# Patient Record
Sex: FEMALE | Race: WHITE | NOT HISPANIC OR LATINO | Employment: FULL TIME | URBAN - METROPOLITAN AREA
[De-identification: names, ages, dates, MRNs, and addresses within clinical notes are randomized per-mention and may not be internally consistent; named-entity substitution may affect disease eponyms.]

---

## 2017-04-10 ENCOUNTER — LAB REQUISITION (OUTPATIENT)
Dept: LAB | Facility: HOSPITAL | Age: 57
End: 2017-04-10
Payer: COMMERCIAL

## 2017-04-10 ENCOUNTER — ALLSCRIPTS OFFICE VISIT (OUTPATIENT)
Dept: OTHER | Facility: OTHER | Age: 57
End: 2017-04-10

## 2017-04-10 DIAGNOSIS — N30.00 ACUTE CYSTITIS WITHOUT HEMATURIA: ICD-10-CM

## 2017-04-10 LAB
BILIRUB UR QL STRIP: NORMAL
CLARITY UR: NORMAL
COLOR UR: CLEAR
GLUCOSE (HISTORICAL): NORMAL
HGB UR QL STRIP.AUTO: NORMAL
KETONES UR STRIP-MCNC: NORMAL MG/DL
LEUKOCYTE ESTERASE UR QL STRIP: NORMAL
NITRITE UR QL STRIP: NORMAL
PH UR STRIP.AUTO: 7 [PH]
PROT UR STRIP-MCNC: NORMAL MG/DL
SP GR UR STRIP.AUTO: 1
UROBILINOGEN UR QL STRIP.AUTO: 0.2

## 2017-04-10 PROCEDURE — 87086 URINE CULTURE/COLONY COUNT: CPT | Performed by: FAMILY MEDICINE

## 2017-04-10 PROCEDURE — 87147 CULTURE TYPE IMMUNOLOGIC: CPT | Performed by: FAMILY MEDICINE

## 2017-04-13 LAB — BACTERIA UR CULT: NORMAL

## 2017-04-14 ENCOUNTER — GENERIC CONVERSION - ENCOUNTER (OUTPATIENT)
Dept: OTHER | Facility: OTHER | Age: 57
End: 2017-04-14

## 2018-01-09 NOTE — RESULT NOTES
Verified Results  (1) URINE CULTURE [id number] 04:58PM Hortensia Najera     Test Name Result Flag Reference   CLINICAL REPORT (Report)     Test:        Urine culture  Specimen Type:   Urine  Specimen Date:   4/10/2017 4:58 PM  Result Date:    4/13/2017 12:50 PM  Result Status:   Final result  Resulting Lab:   [facility] [address] [address]            Tel: [phone]      CULTURE                                       ------------------                                   80,000-89,000 cfu/ml Staphylococcus saprophyticus     *** Routine testing of urine isolates of S  saprophyticus is not recommended      per CLSI guidelines  S  saprophyticus acute, uncomplicated urinary tract infections respond to      concentrations achieved in urine of antimicrobial agents commonly used to      treat them  Antimicrobials commonly used to treat acute, uncomplicated UTI's are      nitrofurantoin, fluoroquinolones or trimethoprim with or without      sulfamethoxazole   ***

## 2018-01-14 VITALS
HEIGHT: 63 IN | BODY MASS INDEX: 29.45 KG/M2 | TEMPERATURE: 96.5 F | DIASTOLIC BLOOD PRESSURE: 84 MMHG | HEART RATE: 68 BPM | WEIGHT: 166.2 LBS | SYSTOLIC BLOOD PRESSURE: 116 MMHG

## 2018-01-29 ENCOUNTER — OFFICE VISIT (OUTPATIENT)
Dept: FAMILY MEDICINE CLINIC | Facility: CLINIC | Age: 58
End: 2018-01-29
Payer: COMMERCIAL

## 2018-01-29 VITALS
TEMPERATURE: 96.5 F | SYSTOLIC BLOOD PRESSURE: 114 MMHG | HEIGHT: 62 IN | HEART RATE: 94 BPM | BODY MASS INDEX: 29.81 KG/M2 | RESPIRATION RATE: 18 BRPM | WEIGHT: 162 LBS | DIASTOLIC BLOOD PRESSURE: 70 MMHG

## 2018-01-29 DIAGNOSIS — G44.009 CLUSTER HEADACHE, NOT INTRACTABLE, UNSPECIFIED CHRONICITY PATTERN: Primary | ICD-10-CM

## 2018-01-29 DIAGNOSIS — F32.A DEPRESSIVE DISORDER: ICD-10-CM

## 2018-01-29 DIAGNOSIS — I10 ESSENTIAL HYPERTENSION: ICD-10-CM

## 2018-01-29 DIAGNOSIS — E78.5 HYPERLIPIDEMIA, UNSPECIFIED HYPERLIPIDEMIA TYPE: ICD-10-CM

## 2018-01-29 DIAGNOSIS — Z78.9 HEALTH MAINTENANCE ALTERATION: Primary | ICD-10-CM

## 2018-01-29 DIAGNOSIS — R68.82 LIBIDO, DECREASED: ICD-10-CM

## 2018-01-29 DIAGNOSIS — F41.9 ANXIETY: ICD-10-CM

## 2018-01-29 PROCEDURE — 3074F SYST BP LT 130 MM HG: CPT | Performed by: FAMILY MEDICINE

## 2018-01-29 PROCEDURE — 3078F DIAST BP <80 MM HG: CPT | Performed by: FAMILY MEDICINE

## 2018-01-29 PROCEDURE — 3008F BODY MASS INDEX DOCD: CPT | Performed by: FAMILY MEDICINE

## 2018-01-29 PROCEDURE — 99214 OFFICE O/P EST MOD 30 MIN: CPT | Performed by: FAMILY MEDICINE

## 2018-01-29 RX ORDER — TRAMADOL HYDROCHLORIDE 50 MG/1
50 TABLET ORAL EVERY 6 HOURS PRN
Qty: 30 TABLET | Refills: 0 | Status: SHIPPED | OUTPATIENT
Start: 2018-01-29 | End: 2018-01-29 | Stop reason: SDUPTHER

## 2018-01-29 RX ORDER — CLONAZEPAM 1 MG/1
1 TABLET ORAL 3 TIMES DAILY
COMMUNITY
End: 2019-08-14 | Stop reason: ALTCHOICE

## 2018-01-29 RX ORDER — FLUNISOLIDE 0.25 MG/ML
2 SOLUTION NASAL 2 TIMES DAILY
COMMUNITY
Start: 2015-04-21 | End: 2019-08-14 | Stop reason: SDUPTHER

## 2018-01-29 RX ORDER — CITALOPRAM 20 MG/1
1 TABLET ORAL DAILY
COMMUNITY
Start: 2015-10-28 | End: 2018-07-18 | Stop reason: SDUPTHER

## 2018-01-29 RX ORDER — ROSUVASTATIN CALCIUM 5 MG/1
1 TABLET, COATED ORAL DAILY
COMMUNITY
Start: 2016-09-26 | End: 2018-07-18 | Stop reason: SDUPTHER

## 2018-01-29 RX ORDER — ARIPIPRAZOLE 5 MG/1
1 TABLET ORAL
COMMUNITY
Start: 2015-10-28 | End: 2018-11-08 | Stop reason: SDUPTHER

## 2018-01-29 RX ORDER — TRAMADOL HYDROCHLORIDE 50 MG/1
50 TABLET ORAL
COMMUNITY
Start: 2011-12-09 | End: 2018-05-31 | Stop reason: SDUPTHER

## 2018-01-29 RX ORDER — BUPROPION HYDROCHLORIDE 150 MG/1
150 TABLET ORAL DAILY
Qty: 30 TABLET | Refills: 2 | Status: SHIPPED | OUTPATIENT
Start: 2018-01-29 | End: 2018-02-12 | Stop reason: SDUPTHER

## 2018-01-29 RX ORDER — LORAZEPAM 1 MG/1
1 TABLET ORAL 3 TIMES DAILY
COMMUNITY
End: 2018-07-31 | Stop reason: SDUPTHER

## 2018-01-29 RX ORDER — TRAMADOL HYDROCHLORIDE 50 MG/1
50 TABLET ORAL EVERY 6 HOURS PRN
Qty: 90 TABLET | Refills: 3 | Status: SHIPPED | OUTPATIENT
Start: 2018-01-29 | End: 2018-10-08 | Stop reason: SDUPTHER

## 2018-01-29 NOTE — PROGRESS NOTES
Assessment/Plan:    No problem-specific Assessment & Plan notes found for this encounter  There are no diagnoses linked to this encounter  Subjective:      Patient ID: Roxana Alanis is a 56 y o  female  Feeling ok except concerned about lack of sexual desire        The following portions of the patient's history were reviewed and updated as appropriate: allergies, current medications, past family history, past medical history, past social history, past surgical history and problem list     Review of Systems   Constitutional: Negative  HENT: Negative  Eyes: Negative  Respiratory: Negative  Cardiovascular: Negative  Gastrointestinal: Negative  Endocrine: Negative  Genitourinary: Negative  Musculoskeletal: Negative  Skin: Negative  Allergic/Immunologic: Negative  Neurological: Negative  Hematological: Negative  Psychiatric/Behavioral: Negative  All other systems reviewed and are negative  Objective:     Physical Exam   Constitutional: She is oriented to person, place, and time  She appears well-developed and well-nourished  HENT:   Head: Normocephalic  Right Ear: Tympanic membrane, external ear and ear canal normal    Left Ear: Tympanic membrane, external ear and ear canal normal    Mouth/Throat: Oropharynx is clear and moist    Eyes: EOM are normal  Pupils are equal, round, and reactive to light  Neck: No thyromegaly present  Cardiovascular: Normal rate, regular rhythm, normal heart sounds and intact distal pulses  Pulmonary/Chest: Breath sounds normal    Abdominal: Soft  She exhibits no mass  There is no tenderness  Musculoskeletal: Normal range of motion  She exhibits no deformity  Lymphadenopathy:     She has no cervical adenopathy  Neurological: She is alert and oriented to person, place, and time  She has normal reflexes  Skin: Skin is warm and dry  No rash noted  No erythema  Psychiatric: She has a normal mood and affect  Nursing note and vitals reviewed

## 2018-02-12 DIAGNOSIS — R68.82 LIBIDO, DECREASED: ICD-10-CM

## 2018-02-13 RX ORDER — BUPROPION HYDROCHLORIDE 150 MG/1
150 TABLET ORAL DAILY
Qty: 90 TABLET | Refills: 3 | Status: SHIPPED | OUTPATIENT
Start: 2018-02-13 | End: 2018-11-08 | Stop reason: SDUPTHER

## 2018-05-26 DIAGNOSIS — R52 PAIN: Primary | ICD-10-CM

## 2018-05-31 DIAGNOSIS — G43.709 CHRONIC MIGRAINE WITHOUT AURA WITHOUT STATUS MIGRAINOSUS, NOT INTRACTABLE: Primary | ICD-10-CM

## 2018-05-31 NOTE — TELEPHONE ENCOUNTER
Dr Kelli Lee pt  That said [facility] has been trying to reach us for a refill with no response from us  I told her I would send it to Dr Kelli Lee and she could refill it tomorrow when she is in  She is asking if there is anyway that you will fill it because she has been out for a couple of days

## 2018-05-31 NOTE — TELEPHONE ENCOUNTER
I do not have diagnosis for this med so unfortunately needs to wait for Dr Juju Mcqueen because she knows pt better

## 2018-06-01 RX ORDER — TRAMADOL HYDROCHLORIDE 50 MG/1
50 TABLET ORAL EVERY 6 HOURS PRN
Qty: 30 TABLET | Refills: 0 | Status: SHIPPED | OUTPATIENT
Start: 2018-06-01 | End: 2018-10-05 | Stop reason: SDUPTHER

## 2018-06-01 RX ORDER — TRAMADOL HYDROCHLORIDE 50 MG/1
TABLET ORAL
Qty: 90 TABLET | Refills: 3 | Status: SHIPPED | OUTPATIENT
Start: 2018-06-01 | End: 2018-10-05 | Stop reason: SDUPTHER

## 2018-06-07 NOTE — TELEPHONE ENCOUNTER
I thought Dr Nancy Justin was supposed to take care of this rx-do I need to still send this rx, please clarify

## 2018-07-18 DIAGNOSIS — E78.5 HYPERLIPIDEMIA, UNSPECIFIED HYPERLIPIDEMIA TYPE: ICD-10-CM

## 2018-07-18 DIAGNOSIS — F32.A DEPRESSION, UNSPECIFIED DEPRESSION TYPE: Primary | ICD-10-CM

## 2018-07-18 RX ORDER — CITALOPRAM 20 MG/1
TABLET ORAL
Qty: 90 TABLET | Refills: 0 | Status: SHIPPED | OUTPATIENT
Start: 2018-07-18 | End: 2018-11-08 | Stop reason: SDUPTHER

## 2018-07-18 RX ORDER — ROSUVASTATIN CALCIUM 5 MG/1
TABLET, COATED ORAL
Qty: 90 TABLET | Refills: 0 | Status: SHIPPED | OUTPATIENT
Start: 2018-07-18 | End: 2018-10-08

## 2018-07-22 ENCOUNTER — TRANSCRIBE ORDERS (OUTPATIENT)
Dept: ADMINISTRATIVE | Facility: HOSPITAL | Age: 58
End: 2018-07-22

## 2018-07-22 ENCOUNTER — APPOINTMENT (OUTPATIENT)
Dept: LAB | Facility: MEDICAL CENTER | Age: 58
End: 2018-07-22
Payer: COMMERCIAL

## 2018-07-22 DIAGNOSIS — F41.9 ANXIETY HYPERVENTILATION: ICD-10-CM

## 2018-07-22 DIAGNOSIS — F45.8 ANXIETY HYPERVENTILATION: ICD-10-CM

## 2018-07-22 DIAGNOSIS — I10 ESSENTIAL HYPERTENSION, MALIGNANT: ICD-10-CM

## 2018-07-22 DIAGNOSIS — E78.5 HYPERLIPIDEMIA, UNSPECIFIED HYPERLIPIDEMIA TYPE: Primary | ICD-10-CM

## 2018-07-22 DIAGNOSIS — E78.5 HYPERLIPIDEMIA, UNSPECIFIED HYPERLIPIDEMIA TYPE: ICD-10-CM

## 2018-07-22 DIAGNOSIS — R68.82 DECREASED LIBIDO: ICD-10-CM

## 2018-07-22 LAB
ALBUMIN SERPL BCP-MCNC: 4 G/DL (ref 3.5–5)
ALP SERPL-CCNC: 112 U/L (ref 46–116)
ALT SERPL W P-5'-P-CCNC: 27 U/L (ref 12–78)
ANION GAP SERPL CALCULATED.3IONS-SCNC: 8 MMOL/L (ref 4–13)
AST SERPL W P-5'-P-CCNC: 18 U/L (ref 5–45)
BASOPHILS # BLD AUTO: 0.03 THOUSANDS/ΜL (ref 0–0.1)
BASOPHILS NFR BLD AUTO: 1 % (ref 0–1)
BILIRUB SERPL-MCNC: 0.69 MG/DL (ref 0.2–1)
BUN SERPL-MCNC: 21 MG/DL (ref 5–25)
CALCIUM SERPL-MCNC: 9.3 MG/DL (ref 8.3–10.1)
CHLORIDE SERPL-SCNC: 107 MMOL/L (ref 100–108)
CHOLEST SERPL-MCNC: 222 MG/DL (ref 50–200)
CO2 SERPL-SCNC: 27 MMOL/L (ref 21–32)
CREAT SERPL-MCNC: 0.86 MG/DL (ref 0.6–1.3)
EOSINOPHIL # BLD AUTO: 0.1 THOUSAND/ΜL (ref 0–0.61)
EOSINOPHIL NFR BLD AUTO: 2 % (ref 0–6)
ERYTHROCYTE [DISTWIDTH] IN BLOOD BY AUTOMATED COUNT: 12.6 % (ref 11.6–15.1)
GFR SERPL CREATININE-BSD FRML MDRD: 76 ML/MIN/1.73SQ M
GLUCOSE P FAST SERPL-MCNC: 112 MG/DL (ref 65–99)
HCT VFR BLD AUTO: 42.9 % (ref 34.8–46.1)
HDLC SERPL-MCNC: 67 MG/DL (ref 40–60)
HGB BLD-MCNC: 13.6 G/DL (ref 11.5–15.4)
IMM GRANULOCYTES # BLD AUTO: 0.02 THOUSAND/UL (ref 0–0.2)
IMM GRANULOCYTES NFR BLD AUTO: 0 % (ref 0–2)
LDLC SERPL CALC-MCNC: 116 MG/DL (ref 0–100)
LYMPHOCYTES # BLD AUTO: 2.24 THOUSANDS/ΜL (ref 0.6–4.47)
LYMPHOCYTES NFR BLD AUTO: 39 % (ref 14–44)
MCH RBC QN AUTO: 29.4 PG (ref 26.8–34.3)
MCHC RBC AUTO-ENTMCNC: 31.7 G/DL (ref 31.4–37.4)
MCV RBC AUTO: 93 FL (ref 82–98)
MONOCYTES # BLD AUTO: 0.38 THOUSAND/ΜL (ref 0.17–1.22)
MONOCYTES NFR BLD AUTO: 7 % (ref 4–12)
NEUTROPHILS # BLD AUTO: 2.97 THOUSANDS/ΜL (ref 1.85–7.62)
NEUTS SEG NFR BLD AUTO: 51 % (ref 43–75)
NONHDLC SERPL-MCNC: 155 MG/DL
NRBC BLD AUTO-RTO: 0 /100 WBCS
PLATELET # BLD AUTO: 334 THOUSANDS/UL (ref 149–390)
PMV BLD AUTO: 9.5 FL (ref 8.9–12.7)
POTASSIUM SERPL-SCNC: 3.8 MMOL/L (ref 3.5–5.3)
PROT SERPL-MCNC: 8.1 G/DL (ref 6.4–8.2)
RBC # BLD AUTO: 4.62 MILLION/UL (ref 3.81–5.12)
SODIUM SERPL-SCNC: 142 MMOL/L (ref 136–145)
TRIGL SERPL-MCNC: 195 MG/DL
TSH SERPL DL<=0.05 MIU/L-ACNC: 0.88 UIU/ML (ref 0.36–3.74)
WBC # BLD AUTO: 5.74 THOUSAND/UL (ref 4.31–10.16)

## 2018-07-22 PROCEDURE — 82672 ASSAY OF ESTROGEN: CPT | Performed by: FAMILY MEDICINE

## 2018-07-22 PROCEDURE — 36415 COLL VENOUS BLD VENIPUNCTURE: CPT

## 2018-07-22 PROCEDURE — 80061 LIPID PANEL: CPT | Performed by: FAMILY MEDICINE

## 2018-07-22 PROCEDURE — 84443 ASSAY THYROID STIM HORMONE: CPT

## 2018-07-22 PROCEDURE — 85025 COMPLETE CBC W/AUTO DIFF WBC: CPT

## 2018-07-22 PROCEDURE — 80053 COMPREHEN METABOLIC PANEL: CPT

## 2018-07-26 ENCOUNTER — TELEPHONE (OUTPATIENT)
Dept: FAMILY MEDICINE CLINIC | Facility: CLINIC | Age: 58
End: 2018-07-26

## 2018-07-26 LAB — ESTROGEN SERPL-MCNC: 56 PG/ML

## 2018-07-26 NOTE — TELEPHONE ENCOUNTER
Spoke with pt  Notified her that labs were okay except her sugars was borderline   She was fasting for the blood work

## 2018-07-31 ENCOUNTER — TELEPHONE (OUTPATIENT)
Dept: FAMILY MEDICINE CLINIC | Facility: CLINIC | Age: 58
End: 2018-07-31

## 2018-07-31 DIAGNOSIS — F41.9 ANXIETY: Primary | ICD-10-CM

## 2018-08-01 RX ORDER — LORAZEPAM 1 MG/1
TABLET ORAL
Qty: 270 TABLET | Refills: 0 | Status: SHIPPED | OUTPATIENT
Start: 2018-08-01 | End: 2018-11-08 | Stop reason: SDUPTHER

## 2018-10-04 NOTE — TELEPHONE ENCOUNTER
Patient States she got a new job, and she wont be able to take off until December and she is running out of her tramadol  Please advise?

## 2018-10-05 DIAGNOSIS — G43.709 CHRONIC MIGRAINE WITHOUT AURA WITHOUT STATUS MIGRAINOSUS, NOT INTRACTABLE: ICD-10-CM

## 2018-10-05 RX ORDER — TRAMADOL HYDROCHLORIDE 50 MG/1
50 TABLET ORAL EVERY 6 HOURS PRN
Qty: 30 TABLET | Refills: 0 | Status: SHIPPED | OUTPATIENT
Start: 2018-10-05 | End: 2018-10-08 | Stop reason: SDUPTHER

## 2018-10-05 NOTE — TELEPHONE ENCOUNTER
With controlled substances MUST see patients every 6  Months, cannot do rx for Tramadol w/o OV, we do have evening hours with nurse practitioner if that is better with her work schedule

## 2018-10-08 ENCOUNTER — OFFICE VISIT (OUTPATIENT)
Dept: FAMILY MEDICINE CLINIC | Facility: CLINIC | Age: 58
End: 2018-10-08
Payer: COMMERCIAL

## 2018-10-08 VITALS
BODY MASS INDEX: 29.26 KG/M2 | WEIGHT: 159 LBS | SYSTOLIC BLOOD PRESSURE: 124 MMHG | HEART RATE: 70 BPM | HEIGHT: 62 IN | TEMPERATURE: 97.9 F | RESPIRATION RATE: 14 BRPM | DIASTOLIC BLOOD PRESSURE: 92 MMHG

## 2018-10-08 DIAGNOSIS — G43.709 CHRONIC MIGRAINE WITHOUT AURA WITHOUT STATUS MIGRAINOSUS, NOT INTRACTABLE: Primary | ICD-10-CM

## 2018-10-08 DIAGNOSIS — I10 ESSENTIAL HYPERTENSION: ICD-10-CM

## 2018-10-08 DIAGNOSIS — F41.9 ANXIETY: ICD-10-CM

## 2018-10-08 DIAGNOSIS — E78.2 MIXED HYPERLIPIDEMIA: ICD-10-CM

## 2018-10-08 DIAGNOSIS — Z23 NEED FOR INFLUENZA VACCINATION: ICD-10-CM

## 2018-10-08 PROCEDURE — 99214 OFFICE O/P EST MOD 30 MIN: CPT | Performed by: NURSE PRACTITIONER

## 2018-10-08 PROCEDURE — 90682 RIV4 VACC RECOMBINANT DNA IM: CPT | Performed by: NURSE PRACTITIONER

## 2018-10-08 PROCEDURE — 90471 IMMUNIZATION ADMIN: CPT | Performed by: NURSE PRACTITIONER

## 2018-10-08 PROCEDURE — 1036F TOBACCO NON-USER: CPT | Performed by: NURSE PRACTITIONER

## 2018-10-08 RX ORDER — UREA 10 %
500 LOTION (ML) TOPICAL 2 TIMES DAILY
COMMUNITY

## 2018-10-08 RX ORDER — MELATONIN
1000 DAILY
COMMUNITY

## 2018-10-08 RX ORDER — CETIRIZINE HYDROCHLORIDE 5 MG/1
5 TABLET ORAL DAILY
COMMUNITY

## 2018-10-08 RX ORDER — OMEPRAZOLE 10 MG/1
10 CAPSULE, DELAYED RELEASE ORAL DAILY
COMMUNITY
End: 2019-08-14 | Stop reason: ALTCHOICE

## 2018-10-08 RX ORDER — TRAMADOL HYDROCHLORIDE 50 MG/1
100 TABLET ORAL 2 TIMES DAILY PRN
Qty: 120 TABLET | Refills: 3 | Status: SHIPPED | OUTPATIENT
Start: 2018-10-08 | End: 2018-11-08 | Stop reason: SDUPTHER

## 2018-10-08 RX ORDER — ROSUVASTATIN CALCIUM 10 MG/1
10 TABLET, COATED ORAL DAILY
Qty: 90 TABLET | Refills: 0 | Status: SHIPPED | OUTPATIENT
Start: 2018-10-08 | End: 2019-03-21 | Stop reason: SDUPTHER

## 2018-10-08 NOTE — ASSESSMENT & PLAN NOTE
Currently on lorazepam 1mg TID  Takes 2 in the AM and states this controls her symptoms fairly well

## 2018-10-08 NOTE — ASSESSMENT & PLAN NOTE
Patient currently taking metoprolol once daily although ordered twice daily but BP doing ok on once daily  Continue  If any elevation evalulate for lisinopril or norvasc

## 2018-10-08 NOTE — PROGRESS NOTES
Chief Complaint   Patient presents with    Follow-up     Pt here for follow up anxiety, depressive disorder, HTN, GERD, headaches, hyperlipidemia  Assessment/Plan:    Essential hypertension  Patient currently taking metoprolol once daily although ordered twice daily but BP doing ok on once daily  Continue  If any elevation evalulate for lisinopril or norvasc  Anxiety  Currently on lorazepam 1mg TID  Takes 2 in the AM and states this controls her symptoms fairly well  Diagnoses and all orders for this visit:    Chronic migraine without aura without status migrainosus, not intractable  -     traMADol (ULTRAM) 50 mg tablet; Take 2 tablets (100 mg total) by mouth 2 (two) times a day as needed for moderate pain    Essential hypertension  -     Basic metabolic panel; Future    Mixed hyperlipidemia  -     rosuvastatin (CRESTOR) 10 MG tablet; Take 1 tablet (10 mg total) by mouth daily  -     Lipid panel; Future  -     Basic metabolic panel; Future    Need for influenza vaccination  -     influenza vaccine, 7527-0506, quadrivalent, recombinant, PF, 0 5 mL, for patients 18 yr+ (FLUBLOK)    Anxiety    Other orders  -     cyanocobalamin 500 MCG tablet; Take 500 mcg by mouth daily  -     omeprazole (PriLOSEC) 10 mg delayed release capsule; Take 10 mg by mouth daily  -     cholecalciferol (VITAMIN D3) 1,000 units tablet; Take 1,000 Units by mouth daily  -     magnesium gluconate (MAGONATE) 500 mg tablet; Take 500 mg by mouth 2 (two) times a day  -     cetirizine (ZyrTEC) 5 MG tablet; Take 5 mg by mouth daily          Subjective:      Patient ID: Braxton Jiménez is a 66 y o  female  Hypertension   This is a chronic problem  The current episode started more than 1 year ago  The problem has been resolved since onset  The problem is controlled  Pertinent negatives include no anxiety, chest pain or palpitations  Risk factors for coronary artery disease include post-menopausal state and family history   Past treatments include beta blockers  The current treatment provides moderate improvement  The following portions of the patient's history were reviewed and updated as appropriate: allergies, current medications, past family history, past medical history, past social history, past surgical history and problem list     Review of Systems   Constitutional: Negative for activity change and fever  HENT: Negative  Eyes: Negative for visual disturbance  Respiratory: Negative for cough and chest tightness  Cardiovascular: Negative for chest pain and palpitations  Psychiatric/Behavioral: Negative for dysphoric mood (stable symptoms) and suicidal ideas  Objective:      /92 (BP Location: Left arm, Patient Position: Sitting, Cuff Size: Adult)   Pulse 70   Temp 97 9 °F (36 6 °C) (Tympanic)   Resp 14   Ht 5' 2 09" (1 577 m)   Wt 72 1 kg (159 lb)   BMI 29 00 kg/m²          Physical Exam   Constitutional: Vital signs are normal  She appears well-developed and well-nourished  She does not have a sickly appearance  She does not appear ill  HENT:   Head: Normocephalic and atraumatic  Neck: Carotid bruit is not present  No thyromegaly present  Cardiovascular: Normal rate, regular rhythm, S1 normal and S2 normal     No murmur heard  Pulses:       Carotid pulses are 2+ on the right side, and 2+ on the left side  No Lower extremity Edema   Pulmonary/Chest: Effort normal and breath sounds normal    Abdominal: Soft  Normal appearance and bowel sounds are normal  There is no tenderness  Lymphadenopathy:     She has no cervical adenopathy  Skin: Skin is warm, dry and intact

## 2018-11-01 ENCOUNTER — TELEPHONE (OUTPATIENT)
Dept: FAMILY MEDICINE CLINIC | Facility: CLINIC | Age: 58
End: 2018-11-01

## 2018-11-08 DIAGNOSIS — G44.029 CHRONIC CLUSTER HEADACHE, NOT INTRACTABLE: Primary | ICD-10-CM

## 2018-11-08 DIAGNOSIS — F41.9 ANXIETY: ICD-10-CM

## 2018-11-08 DIAGNOSIS — G43.709 CHRONIC MIGRAINE WITHOUT AURA WITHOUT STATUS MIGRAINOSUS, NOT INTRACTABLE: ICD-10-CM

## 2018-11-08 DIAGNOSIS — R68.82 LIBIDO, DECREASED: ICD-10-CM

## 2018-11-08 DIAGNOSIS — F32.A DEPRESSIVE DISORDER: ICD-10-CM

## 2018-11-08 DIAGNOSIS — F32.A DEPRESSION, UNSPECIFIED DEPRESSION TYPE: ICD-10-CM

## 2018-11-08 DIAGNOSIS — I10 ESSENTIAL HYPERTENSION: ICD-10-CM

## 2018-11-08 RX ORDER — CLONAZEPAM 1 MG/1
1 TABLET ORAL 3 TIMES DAILY
Qty: 60 TABLET | Refills: 0 | Status: CANCELLED | OUTPATIENT
Start: 2018-11-08

## 2018-11-08 NOTE — TELEPHONE ENCOUNTER
Patient called in for refill on   traMADol (ULTRAM) 50 mg tablet  LORazepam (ATIVAN) 1 mg tablet  clonazePAM (KlonoPIN) 1 mg tablet  citalopram (CeleXA) 20 mg tablet  buPROPion (WELLBUTRIN XL) 150 mg 24 hr tablet ()  ARIPiprazole (ABILIFY) 5 mg tablet  metoprolol tartrate (LOPRESSOR) 25 mg tablet  [facility]   Last ov was 10/08/18

## 2018-11-09 RX ORDER — LORAZEPAM 1 MG/1
1 TABLET ORAL 3 TIMES DAILY PRN
Qty: 270 TABLET | Refills: 0 | Status: SHIPPED | OUTPATIENT
Start: 2018-11-09 | End: 2019-02-25 | Stop reason: SDUPTHER

## 2018-11-09 RX ORDER — CITALOPRAM 20 MG/1
20 TABLET ORAL DAILY
Qty: 90 TABLET | Refills: 1 | Status: SHIPPED | OUTPATIENT
Start: 2018-11-09 | End: 2019-05-21 | Stop reason: SDUPTHER

## 2018-11-09 RX ORDER — BUPROPION HYDROCHLORIDE 150 MG/1
150 TABLET ORAL DAILY
Qty: 90 TABLET | Refills: 1 | Status: SHIPPED | OUTPATIENT
Start: 2018-11-09 | End: 2019-05-21 | Stop reason: SDUPTHER

## 2018-11-09 RX ORDER — TRAMADOL HYDROCHLORIDE 50 MG/1
100 TABLET ORAL 2 TIMES DAILY PRN
Qty: 120 TABLET | Refills: 0 | Status: SHIPPED | OUTPATIENT
Start: 2018-11-09 | End: 2018-12-18 | Stop reason: SDUPTHER

## 2018-11-09 RX ORDER — ARIPIPRAZOLE 5 MG/1
5 TABLET ORAL DAILY
Qty: 90 TABLET | Refills: 1 | Status: SHIPPED | OUTPATIENT
Start: 2018-11-09 | End: 2019-06-25 | Stop reason: SDUPTHER

## 2018-11-09 NOTE — TELEPHONE ENCOUNTER
Not comfortable with patient being on clonazepam and lorazepam  Refilled lorazepam since that was discussed at her most recent ov

## 2018-12-18 DIAGNOSIS — G44.029 CHRONIC CLUSTER HEADACHE, NOT INTRACTABLE: ICD-10-CM

## 2018-12-18 RX ORDER — TRAMADOL HYDROCHLORIDE 50 MG/1
100 TABLET ORAL 2 TIMES DAILY PRN
Qty: 120 TABLET | Refills: 0 | Status: SHIPPED | OUTPATIENT
Start: 2018-12-18 | End: 2019-01-22 | Stop reason: SDUPTHER

## 2018-12-18 NOTE — TELEPHONE ENCOUNTER
Pt called and left request on rx line  You gave her 100 tabs last fill per PDMP 11/19/18 and system has 120tabs, not sure what you want to send so I will leave for you to decide

## 2019-01-02 ENCOUNTER — OFFICE VISIT (OUTPATIENT)
Dept: FAMILY MEDICINE CLINIC | Facility: CLINIC | Age: 59
End: 2019-01-02
Payer: COMMERCIAL

## 2019-01-02 VITALS
RESPIRATION RATE: 18 BRPM | DIASTOLIC BLOOD PRESSURE: 70 MMHG | HEIGHT: 62 IN | BODY MASS INDEX: 29.08 KG/M2 | TEMPERATURE: 96.3 F | SYSTOLIC BLOOD PRESSURE: 98 MMHG | HEART RATE: 72 BPM | WEIGHT: 158 LBS

## 2019-01-02 DIAGNOSIS — R19.5 WATERY STOOLS: Primary | ICD-10-CM

## 2019-01-02 DIAGNOSIS — R19.5 RED STOOL: ICD-10-CM

## 2019-01-02 PROCEDURE — 99213 OFFICE O/P EST LOW 20 MIN: CPT | Performed by: NURSE PRACTITIONER

## 2019-01-02 PROCEDURE — 3008F BODY MASS INDEX DOCD: CPT | Performed by: NURSE PRACTITIONER

## 2019-01-02 RX ORDER — DICYCLOMINE HYDROCHLORIDE 10 MG/1
10 CAPSULE ORAL 4 TIMES DAILY PRN
Qty: 30 CAPSULE | Refills: 0 | Status: SHIPPED | OUTPATIENT
Start: 2019-01-02 | End: 2019-08-14 | Stop reason: ALTCHOICE

## 2019-01-02 NOTE — PROGRESS NOTES
Chief Complaint   Patient presents with    Loss of Appetite     Patient present today for loss of appetite, watery bowel movement (red/orange) foul smell and nausea about a month ago  Per patietn she has had been having watery bowel movements for about 1 year  Assessment/Plan:     Diagnoses and all orders for this visit:    Watery stools  -     Clostridium difficile toxin by PCR; Future  -     Ova and parasite examination; Future  -     CBC and differential; Future  -     Stool Enteric Bacterial Panel by PCR; Future  -     dicyclomine (BENTYL) 10 mg capsule; Take 1 capsule (10 mg total) by mouth 4 (four) times a day as needed (loose stools and cramping )    Red stool  -     Clostridium difficile toxin by PCR; Future  -     Ova and parasite examination; Future  -     CBC and differential; Future  -     Stool Enteric Bacterial Panel by PCR; Future  -     dicyclomine (BENTYL) 10 mg capsule; Take 1 capsule (10 mg total) by mouth 4 (four) times a day as needed (loose stools and cramping )          Subjective:      Patient ID: Ana Draper is a 66 y o  female  Patient states watery diarrhea has persisted for 1 month  She has not traveled outside the [address]  She states she had red/orange stools for a while (but patient can not define timeline)  patietn states she was weighing herself at home was 165 and recent weight was 153  She states she not eatign very well  The following portions of the patient's history were reviewed and updated as appropriate: allergies, current medications, past family history, past medical history, past social history, past surgical history and problem list     Review of Systems   Constitutional: Positive for appetite change and fatigue  Negative for diaphoresis (just a few) and fever  HENT: Negative for trouble swallowing  Respiratory: Negative for chest tightness and shortness of breath  Cardiovascular: Negative for chest pain and palpitations     Gastrointestinal: Positive for blood in stool (has occured), diarrhea (always) and nausea  Negative for abdominal pain, anal bleeding and vomiting  Color of bowels is red/orange  Patient does take zegrid  Genitourinary: Negative for decreased urine volume  Cloudy urine   Musculoskeletal: Negative for myalgias  Neurological: Negative for dizziness and light-headedness  Objective:      BP 98/70 (BP Location: Right arm, Patient Position: Sitting, Cuff Size: Standard)   Pulse 72   Temp (!) 96 3 °F (35 7 °C) (Temporal)   Resp 18   Ht 5' 2 09" (1 577 m)   Wt 71 7 kg (158 lb)   BMI 28 81 kg/m²          Physical Exam   Constitutional: She is oriented to person, place, and time  She appears well-developed and well-nourished  HENT:   Head: Normocephalic and atraumatic  Right Ear: Tympanic membrane normal    Left Ear: Tympanic membrane normal    Nose: Nose normal    Mouth/Throat: Uvula is midline, oropharynx is clear and moist and mucous membranes are normal    Cardiovascular: Normal rate, regular rhythm and normal heart sounds  Pulmonary/Chest: Effort normal and breath sounds normal    Abdominal: Soft  Bowel sounds are increased  There is no hepatosplenomegaly  There is tenderness (mild) in the left lower quadrant  There is no CVA tenderness  Neurological: She is alert and oriented to person, place, and time  Nursing note and vitals reviewed

## 2019-01-15 ENCOUNTER — APPOINTMENT (OUTPATIENT)
Dept: LAB | Facility: MEDICAL CENTER | Age: 59
End: 2019-01-15
Payer: COMMERCIAL

## 2019-01-15 DIAGNOSIS — R19.5 RED STOOL: ICD-10-CM

## 2019-01-15 DIAGNOSIS — I10 ESSENTIAL HYPERTENSION: ICD-10-CM

## 2019-01-15 DIAGNOSIS — E78.2 MIXED HYPERLIPIDEMIA: ICD-10-CM

## 2019-01-15 DIAGNOSIS — R19.5 WATERY STOOLS: ICD-10-CM

## 2019-01-15 LAB
BASOPHILS # BLD AUTO: 0.05 THOUSANDS/ΜL (ref 0–0.1)
BASOPHILS NFR BLD AUTO: 1 % (ref 0–1)
EOSINOPHIL # BLD AUTO: 0.28 THOUSAND/ΜL (ref 0–0.61)
EOSINOPHIL NFR BLD AUTO: 3 % (ref 0–6)
ERYTHROCYTE [DISTWIDTH] IN BLOOD BY AUTOMATED COUNT: 11.9 % (ref 11.6–15.1)
HCT VFR BLD AUTO: 39.4 % (ref 34.8–46.1)
HGB BLD-MCNC: 12.8 G/DL (ref 11.5–15.4)
IMM GRANULOCYTES # BLD AUTO: 0.01 THOUSAND/UL (ref 0–0.2)
IMM GRANULOCYTES NFR BLD AUTO: 0 % (ref 0–2)
LYMPHOCYTES # BLD AUTO: 3.09 THOUSANDS/ΜL (ref 0.6–4.47)
LYMPHOCYTES NFR BLD AUTO: 35 % (ref 14–44)
MCH RBC QN AUTO: 30.2 PG (ref 26.8–34.3)
MCHC RBC AUTO-ENTMCNC: 32.5 G/DL (ref 31.4–37.4)
MCV RBC AUTO: 93 FL (ref 82–98)
MONOCYTES # BLD AUTO: 0.48 THOUSAND/ΜL (ref 0.17–1.22)
MONOCYTES NFR BLD AUTO: 5 % (ref 4–12)
NEUTROPHILS # BLD AUTO: 4.92 THOUSANDS/ΜL (ref 1.85–7.62)
NEUTS SEG NFR BLD AUTO: 56 % (ref 43–75)
NRBC BLD AUTO-RTO: 0 /100 WBCS
PLATELET # BLD AUTO: 330 THOUSANDS/UL (ref 149–390)
PMV BLD AUTO: 10 FL (ref 8.9–12.7)
RBC # BLD AUTO: 4.24 MILLION/UL (ref 3.81–5.12)
WBC # BLD AUTO: 8.83 THOUSAND/UL (ref 4.31–10.16)

## 2019-01-15 PROCEDURE — 36415 COLL VENOUS BLD VENIPUNCTURE: CPT

## 2019-01-15 PROCEDURE — 87177 OVA AND PARASITES SMEARS: CPT

## 2019-01-15 PROCEDURE — 85025 COMPLETE CBC W/AUTO DIFF WBC: CPT

## 2019-01-15 PROCEDURE — 87505 NFCT AGENT DETECTION GI: CPT

## 2019-01-15 PROCEDURE — 87493 C DIFF AMPLIFIED PROBE: CPT

## 2019-01-15 PROCEDURE — 87209 SMEAR COMPLEX STAIN: CPT

## 2019-01-16 LAB — C DIFF TOX GENS STL QL NAA+PROBE: NORMAL

## 2019-01-17 ENCOUNTER — TELEPHONE (OUTPATIENT)
Dept: FAMILY MEDICINE CLINIC | Facility: CLINIC | Age: 59
End: 2019-01-17

## 2019-01-17 LAB
CAMPYLOBACTER DNA SPEC NAA+PROBE: NORMAL
O+P STL CONC: NORMAL
SALMONELLA DNA SPEC QL NAA+PROBE: NORMAL
SHIGA TOXIN STX GENE SPEC NAA+PROBE: NORMAL
SHIGELLA DNA SPEC QL NAA+PROBE: NORMAL

## 2019-01-17 NOTE — TELEPHONE ENCOUNTER
----- Message from [address] [place] [address] sent at 1/17/2019  2:24 PM EST -----  Stool cultures all negative

## 2019-01-22 DIAGNOSIS — G44.029 CHRONIC CLUSTER HEADACHE, NOT INTRACTABLE: ICD-10-CM

## 2019-01-22 RX ORDER — TRAMADOL HYDROCHLORIDE 50 MG/1
100 TABLET ORAL 2 TIMES DAILY PRN
Qty: 120 TABLET | Refills: 0 | Status: SHIPPED | OUTPATIENT
Start: 2019-01-22 | End: 2019-03-28 | Stop reason: SDUPTHER

## 2019-02-25 DIAGNOSIS — F41.9 ANXIETY: ICD-10-CM

## 2019-02-25 RX ORDER — LORAZEPAM 1 MG/1
1 TABLET ORAL 3 TIMES DAILY PRN
Qty: 270 TABLET | Refills: 0 | Status: SHIPPED | OUTPATIENT
Start: 2019-02-25 | End: 2019-05-28 | Stop reason: SDUPTHER

## 2019-03-21 DIAGNOSIS — E78.2 MIXED HYPERLIPIDEMIA: ICD-10-CM

## 2019-03-22 RX ORDER — ROSUVASTATIN CALCIUM 10 MG/1
10 TABLET, COATED ORAL DAILY
Qty: 90 TABLET | Refills: 0 | Status: SHIPPED | OUTPATIENT
Start: 2019-03-22 | End: 2019-06-21 | Stop reason: SDUPTHER

## 2019-03-28 DIAGNOSIS — G44.029 CHRONIC CLUSTER HEADACHE, NOT INTRACTABLE: ICD-10-CM

## 2019-03-29 RX ORDER — TRAMADOL HYDROCHLORIDE 50 MG/1
100 TABLET ORAL 2 TIMES DAILY PRN
Qty: 120 TABLET | Refills: 0 | Status: SHIPPED | OUTPATIENT
Start: 2019-03-29 | End: 2019-05-06 | Stop reason: SDUPTHER

## 2019-05-06 DIAGNOSIS — G44.029 CHRONIC CLUSTER HEADACHE, NOT INTRACTABLE: ICD-10-CM

## 2019-05-07 RX ORDER — TRAMADOL HYDROCHLORIDE 50 MG/1
100 TABLET ORAL 2 TIMES DAILY PRN
Qty: 120 TABLET | Refills: 0 | Status: SHIPPED | OUTPATIENT
Start: 2019-05-07 | End: 2019-06-20 | Stop reason: SDUPTHER

## 2019-05-09 ENCOUNTER — TELEPHONE (OUTPATIENT)
Dept: FAMILY MEDICINE CLINIC | Facility: CLINIC | Age: 59
End: 2019-05-09

## 2019-05-21 DIAGNOSIS — F32.A DEPRESSIVE DISORDER: ICD-10-CM

## 2019-05-21 DIAGNOSIS — R68.82 LIBIDO, DECREASED: ICD-10-CM

## 2019-05-21 DIAGNOSIS — Z12.39 SCREENING FOR BREAST CANCER: Primary | ICD-10-CM

## 2019-05-22 RX ORDER — BUPROPION HYDROCHLORIDE 150 MG/1
150 TABLET ORAL DAILY
Qty: 90 TABLET | Refills: 1 | Status: SHIPPED | OUTPATIENT
Start: 2019-05-22 | End: 2019-08-14 | Stop reason: SDUPTHER

## 2019-05-22 RX ORDER — CITALOPRAM 20 MG/1
20 TABLET ORAL DAILY
Qty: 90 TABLET | Refills: 1 | Status: SHIPPED | OUTPATIENT
Start: 2019-05-22 | End: 2019-08-14 | Stop reason: ALTCHOICE

## 2019-05-28 DIAGNOSIS — F41.9 ANXIETY: ICD-10-CM

## 2019-05-28 RX ORDER — LORAZEPAM 1 MG/1
1 TABLET ORAL 3 TIMES DAILY PRN
Qty: 270 TABLET | Refills: 0 | Status: SHIPPED | OUTPATIENT
Start: 2019-05-28 | End: 2019-09-04 | Stop reason: SDUPTHER

## 2019-05-29 ENCOUNTER — TELEPHONE (OUTPATIENT)
Dept: FAMILY MEDICINE CLINIC | Facility: CLINIC | Age: 59
End: 2019-05-29

## 2019-06-14 DIAGNOSIS — G44.029 CHRONIC CLUSTER HEADACHE, NOT INTRACTABLE: ICD-10-CM

## 2019-06-17 RX ORDER — TRAMADOL HYDROCHLORIDE 50 MG/1
100 TABLET ORAL 2 TIMES DAILY PRN
Qty: 120 TABLET | Refills: 0 | OUTPATIENT
Start: 2019-06-17

## 2019-06-20 RX ORDER — TRAMADOL HYDROCHLORIDE 50 MG/1
100 TABLET ORAL 2 TIMES DAILY PRN
Qty: 120 TABLET | Refills: 0 | Status: SHIPPED | OUTPATIENT
Start: 2019-06-20 | End: 2019-07-30 | Stop reason: SDUPTHER

## 2019-06-21 DIAGNOSIS — E78.2 MIXED HYPERLIPIDEMIA: ICD-10-CM

## 2019-06-21 RX ORDER — ROSUVASTATIN CALCIUM 10 MG/1
10 TABLET, COATED ORAL DAILY
Qty: 90 TABLET | Refills: 0 | Status: SHIPPED | OUTPATIENT
Start: 2019-06-21 | End: 2019-08-14 | Stop reason: SDUPTHER

## 2019-06-25 DIAGNOSIS — F32.A DEPRESSIVE DISORDER: ICD-10-CM

## 2019-06-25 RX ORDER — ARIPIPRAZOLE 5 MG/1
5 TABLET ORAL DAILY
Qty: 90 TABLET | Refills: 1 | Status: SHIPPED | OUTPATIENT
Start: 2019-06-25 | End: 2019-08-14 | Stop reason: SDUPTHER

## 2019-07-10 ENCOUNTER — HOSPITAL ENCOUNTER (OUTPATIENT)
Dept: MAMMOGRAPHY | Facility: MEDICAL CENTER | Age: 59
Discharge: HOME | End: 2019-07-10
Payer: COMMERCIAL

## 2019-07-10 VITALS — BODY MASS INDEX: 29.08 KG/M2 | WEIGHT: 158 LBS | HEIGHT: 62 IN

## 2019-07-10 DIAGNOSIS — Z12.39 SCREENING FOR BREAST CANCER: ICD-10-CM

## 2019-07-10 PROCEDURE — 77063 BREAST TOMOSYNTHESIS BI: CPT

## 2019-07-10 PROCEDURE — 77067 SCR MAMMO BI INCL CAD: CPT

## 2019-07-14 ENCOUNTER — APPOINTMENT (OUTPATIENT)
Dept: LAB | Facility: MEDICAL CENTER | Age: 59
End: 2019-07-14
Payer: COMMERCIAL

## 2019-07-14 LAB
ANION GAP SERPL CALCULATED.3IONS-SCNC: 9 MMOL/L (ref 4–13)
BUN SERPL-MCNC: 16 MG/DL (ref 5–25)
CALCIUM SERPL-MCNC: 8.9 MG/DL (ref 8.3–10.1)
CHLORIDE SERPL-SCNC: 104 MMOL/L (ref 100–108)
CHOLEST SERPL-MCNC: 213 MG/DL (ref 50–200)
CO2 SERPL-SCNC: 27 MMOL/L (ref 21–32)
CREAT SERPL-MCNC: 0.92 MG/DL (ref 0.6–1.3)
GFR SERPL CREATININE-BSD FRML MDRD: 69 ML/MIN/1.73SQ M
GLUCOSE P FAST SERPL-MCNC: 96 MG/DL (ref 65–99)
HDLC SERPL-MCNC: 87 MG/DL (ref 40–60)
LDLC SERPL CALC-MCNC: 104 MG/DL (ref 0–100)
NONHDLC SERPL-MCNC: 126 MG/DL
POTASSIUM SERPL-SCNC: 4.1 MMOL/L (ref 3.5–5.3)
SODIUM SERPL-SCNC: 140 MMOL/L (ref 136–145)
TRIGL SERPL-MCNC: 111 MG/DL

## 2019-07-14 PROCEDURE — 36415 COLL VENOUS BLD VENIPUNCTURE: CPT

## 2019-07-14 PROCEDURE — 80061 LIPID PANEL: CPT

## 2019-07-14 PROCEDURE — 80048 BASIC METABOLIC PNL TOTAL CA: CPT

## 2019-07-16 ENCOUNTER — TELEPHONE (OUTPATIENT)
Dept: FAMILY MEDICINE CLINIC | Facility: CLINIC | Age: 59
End: 2019-07-16

## 2019-07-16 NOTE — TELEPHONE ENCOUNTER
----- Message from [address] [place] [address] sent at 7/16/2019  1:27 PM EDT -----  Cholesterol slightly improved   Sugar kidneys are good

## 2019-07-17 ENCOUNTER — TELEPHONE (OUTPATIENT)
Dept: FAMILY MEDICINE CLINIC | Facility: CLINIC | Age: 59
End: 2019-07-17

## 2019-07-17 NOTE — TELEPHONE ENCOUNTER
----- Message from [address] [place] [address] sent at 7/16/2019  1:36 PM EDT -----  Normal mammo

## 2019-07-23 DIAGNOSIS — I10 ESSENTIAL HYPERTENSION: ICD-10-CM

## 2019-07-23 NOTE — TELEPHONE ENCOUNTER
Pharmacy is requesting a refill for pt's Metoprolol Tartrate 25 mg tablets, quantity 90   Please send to [facility] on [address]

## 2019-07-30 DIAGNOSIS — G44.029 CHRONIC CLUSTER HEADACHE, NOT INTRACTABLE: ICD-10-CM

## 2019-07-31 RX ORDER — TRAMADOL HYDROCHLORIDE 50 MG/1
100 TABLET ORAL 2 TIMES DAILY PRN
Qty: 120 TABLET | Refills: 0 | Status: SHIPPED | OUTPATIENT
Start: 2019-07-31 | End: 2019-08-14 | Stop reason: SDUPTHER

## 2019-08-13 DIAGNOSIS — F41.9 ANXIETY: ICD-10-CM

## 2019-08-13 DIAGNOSIS — G44.029 CHRONIC CLUSTER HEADACHE, NOT INTRACTABLE: Primary | ICD-10-CM

## 2019-08-14 ENCOUNTER — OFFICE VISIT (OUTPATIENT)
Dept: FAMILY MEDICINE CLINIC | Facility: CLINIC | Age: 59
End: 2019-08-14
Payer: COMMERCIAL

## 2019-08-14 ENCOUNTER — LAB (OUTPATIENT)
Dept: LAB | Facility: MEDICAL CENTER | Age: 59
End: 2019-08-14
Payer: COMMERCIAL

## 2019-08-14 VITALS
BODY MASS INDEX: 27.34 KG/M2 | WEIGHT: 148.6 LBS | HEART RATE: 72 BPM | DIASTOLIC BLOOD PRESSURE: 72 MMHG | HEIGHT: 62 IN | SYSTOLIC BLOOD PRESSURE: 124 MMHG

## 2019-08-14 DIAGNOSIS — Z11.59 ENCOUNTER FOR HEPATITIS C SCREENING TEST FOR LOW RISK PATIENT: ICD-10-CM

## 2019-08-14 DIAGNOSIS — E78.2 MIXED HYPERLIPIDEMIA: ICD-10-CM

## 2019-08-14 DIAGNOSIS — I10 ESSENTIAL HYPERTENSION: ICD-10-CM

## 2019-08-14 DIAGNOSIS — F41.9 ANXIETY: ICD-10-CM

## 2019-08-14 DIAGNOSIS — F32.A DEPRESSIVE DISORDER: ICD-10-CM

## 2019-08-14 DIAGNOSIS — E66.3 OVERWEIGHT (BMI 25.0-29.9): ICD-10-CM

## 2019-08-14 DIAGNOSIS — K64.9 HEMORRHOIDS, UNSPECIFIED HEMORRHOID TYPE: ICD-10-CM

## 2019-08-14 DIAGNOSIS — G44.029 CHRONIC CLUSTER HEADACHE, NOT INTRACTABLE: Primary | ICD-10-CM

## 2019-08-14 DIAGNOSIS — G44.021 INTRACTABLE CHRONIC CLUSTER HEADACHE: Primary | ICD-10-CM

## 2019-08-14 DIAGNOSIS — J30.9 ALLERGIC RHINITIS, UNSPECIFIED SEASONALITY, UNSPECIFIED TRIGGER: ICD-10-CM

## 2019-08-14 PROCEDURE — 36415 COLL VENOUS BLD VENIPUNCTURE: CPT

## 2019-08-14 PROCEDURE — 80307 DRUG TEST PRSMV CHEM ANLYZR: CPT

## 2019-08-14 PROCEDURE — 99214 OFFICE O/P EST MOD 30 MIN: CPT | Performed by: NURSE PRACTITIONER

## 2019-08-14 PROCEDURE — 1036F TOBACCO NON-USER: CPT | Performed by: NURSE PRACTITIONER

## 2019-08-14 PROCEDURE — 3074F SYST BP LT 130 MM HG: CPT | Performed by: NURSE PRACTITIONER

## 2019-08-14 PROCEDURE — 3008F BODY MASS INDEX DOCD: CPT | Performed by: NURSE PRACTITIONER

## 2019-08-14 RX ORDER — CITALOPRAM 20 MG/1
20 TABLET ORAL DAILY
Qty: 90 TABLET | Refills: 1 | Status: SHIPPED | OUTPATIENT
Start: 2019-08-14 | End: 2020-01-29 | Stop reason: ALTCHOICE

## 2019-08-14 RX ORDER — CITALOPRAM 20 MG/1
20 TABLET ORAL DAILY
COMMUNITY
End: 2019-08-14 | Stop reason: SDUPTHER

## 2019-08-14 RX ORDER — BUPROPION HYDROCHLORIDE 150 MG/1
150 TABLET ORAL DAILY
Qty: 90 TABLET | Refills: 1 | Status: SHIPPED | OUTPATIENT
Start: 2019-08-14 | End: 2020-01-29 | Stop reason: ALTCHOICE

## 2019-08-14 RX ORDER — FLUNISOLIDE 0.25 MG/ML
2 SOLUTION NASAL 2 TIMES DAILY
Qty: 3 BOTTLE | Refills: 0 | Status: SHIPPED | OUTPATIENT
Start: 2019-08-14 | End: 2020-09-01 | Stop reason: ALTCHOICE

## 2019-08-14 RX ORDER — ROSUVASTATIN CALCIUM 10 MG/1
10 TABLET, COATED ORAL DAILY
Qty: 90 TABLET | Refills: 1 | Status: SHIPPED | OUTPATIENT
Start: 2019-08-14 | End: 2020-01-29 | Stop reason: SDUPTHER

## 2019-08-14 RX ORDER — OMEPRAZOLE/SODIUM BICARBONATE 20MG-1.1G
CAPSULE ORAL
COMMUNITY

## 2019-08-14 RX ORDER — TRAMADOL HYDROCHLORIDE 50 MG/1
100 TABLET ORAL 2 TIMES DAILY PRN
Qty: 120 TABLET | Refills: 0 | Status: SHIPPED | OUTPATIENT
Start: 2019-08-28 | End: 2020-01-29 | Stop reason: ALTCHOICE

## 2019-08-14 RX ORDER — ARIPIPRAZOLE 5 MG/1
5 TABLET ORAL DAILY
Qty: 90 TABLET | Refills: 1 | Status: SHIPPED | OUTPATIENT
Start: 2019-08-14 | End: 2020-01-29 | Stop reason: ALTCHOICE

## 2019-08-14 RX ORDER — LORAZEPAM 1 MG/1
1 TABLET ORAL 3 TIMES DAILY PRN
Qty: 270 TABLET | Refills: 0 | Status: CANCELLED | OUTPATIENT
Start: 2019-08-14

## 2019-08-14 NOTE — PROGRESS NOTES
Assessment and Plan:    Problem List Items Addressed This Visit        Digestive    Hemorrhoids    Relevant Medications    hydrocortisone (PROCTOSOL HC) 2 5 % rectal cream       Respiratory    Allergic rhinitis    Relevant Medications    flunisolide (NASALIDE) 25 MCG/ACT (0 025%) SOLN       Cardiovascular and Mediastinum    Essential hypertension     Patient blood pressure doing very well  Continue same medications  Relevant Medications    metoprolol tartrate (LOPRESSOR) 25 mg tablet    Other Relevant Orders    TSH, 3rd generation with Free T4 reflex    Hepatic function panel       Nervous and Auditory    Headaches, cluster - Primary    Relevant Medications    buPROPion (WELLBUTRIN XL) 150 mg 24 hr tablet    citalopram (CeleXA) 20 mg tablet    metoprolol tartrate (LOPRESSOR) 25 mg tablet    traMADol (ULTRAM) 50 mg tablet (Start on 8/28/2019)       Other    Anxiety     We controlled on lorazepam  Only takes at bedtime  Relevant Medications    citalopram (CeleXA) 20 mg tablet    Depressive disorder     Patient states her depression is in full remission  She is maintained with wellbutrin, abilify and lorazepam           Relevant Medications    ARIPiprazole (ABILIFY) 5 mg tablet    buPROPion (WELLBUTRIN XL) 150 mg 24 hr tablet    citalopram (CeleXA) 20 mg tablet    Mixed hyperlipidemia     Patient losing weight and her cholesterol is improved  Relevant Medications    rosuvastatin (CRESTOR) 10 MG tablet    Other Relevant Orders    Hepatic function panel      Other Visit Diagnoses     Encounter for hepatitis C screening test for low risk patient        Relevant Orders    Hepatitis C antibody    Overweight (BMI 25 0-29  9)                     Diagnoses and all orders for this visit:    Chronic cluster headache, not intractable  -     traMADol (ULTRAM) 50 mg tablet;  Take 2 tablets (100 mg total) by mouth 2 (two) times a day as needed for moderate pain    Depressive disorder  -     ARIPiprazole (ABILIFY) 5 mg tablet; Take 1 tablet (5 mg total) by mouth daily  -     buPROPion (WELLBUTRIN XL) 150 mg 24 hr tablet; Take 1 tablet (150 mg total) by mouth daily for 90 days  -     citalopram (CeleXA) 20 mg tablet; Take 1 tablet (20 mg total) by mouth daily    Anxiety  -     citalopram (CeleXA) 20 mg tablet; Take 1 tablet (20 mg total) by mouth daily    Essential hypertension  -     metoprolol tartrate (LOPRESSOR) 25 mg tablet; Take 1 tablet (25 mg total) by mouth daily  -     TSH, 3rd generation with Free T4 reflex  -     Hepatic function panel    Mixed hyperlipidemia  -     rosuvastatin (CRESTOR) 10 MG tablet; Take 1 tablet (10 mg total) by mouth daily  -     Hepatic function panel    Allergic rhinitis, unspecified seasonality, unspecified trigger  -     flunisolide (NASALIDE) 25 MCG/ACT (0 025%) SOLN; Inhale 2 sprays 2 (two) times a day    Hemorrhoids, unspecified hemorrhoid type  -     hydrocortisone (PROCTOSOL HC) 2 5 % rectal cream; Insert into the rectum 2 (two) times a day as needed for hemorrhoids    Encounter for hepatitis C screening test for low risk patient  -     Hepatitis C antibody    Overweight (BMI 25 0-29  9)    Other orders  -     Discontinue: citalopram (CeleXA) 20 mg tablet; Take 20 mg by mouth daily  -     Omeprazole-Sodium Bicarbonate (ZEGERID)  MG CAPS; Take by mouth  -     Cancel: LORazepam (ATIVAN) 1 mg tablet; Take 1 tablet (1 mg total) by mouth 3 (three) times a day as needed for anxiety          PDMP checked, verified  Prescription request appropriate  Last Fill 8/12/19-for lorazepam and 7/31/19 for tramadol  Controlled substance contract verbally reviewed with patient and signed  Subjective:      Patient ID: Nikole Figueroa is a 64 y o  female  CC:    Chief Complaint   Patient presents with    Follow-up     Follow up  kw    Depression    Hyperlipidemia    Hypertension       HPI:    Hypertension   This is a chronic problem   The current episode started more than 1 year ago  The problem is unchanged  The problem is controlled  Pertinent negatives include no anxiety, headaches, palpitations, peripheral edema or shortness of breath  There are no associated agents to hypertension  Risk factors for coronary artery disease include post-menopausal state  Past treatments include diuretics  The current treatment provides significant improvement  There are no compliance problems  Hyperlipidemia   This is a chronic problem  The current episode started more than 1 year ago  The problem is uncontrolled  Recent lipid tests were reviewed and are high  Pertinent negatives include no shortness of breath  Current antihyperlipidemic treatment includes statins  The current treatment provides moderate improvement of lipids  Risk factors for coronary artery disease include post-menopausal        The following portions of the patient's history were reviewed and updated as appropriate: allergies, current medications, past family history, past medical history, past social history, past surgical history and problem list       Review of Systems   Constitutional: Positive for appetite change and unexpected weight change  Respiratory: Negative for shortness of breath  Cardiovascular: Negative for palpitations  Gastrointestinal: Positive for diarrhea (soemtimes loose sometimes formed for the last year 2 times a day )  Negative for abdominal pain and constipation  Denies heartburn is taking zegrid  Neurological: Negative for headaches  Data to review:       Objective:    Vitals:    08/14/19 1745   BP: 124/72   BP Location: Left arm   Patient Position: Sitting   Pulse: 72   Weight: 67 4 kg (148 lb 9 6 oz)   Height: 5' 2" (1 575 m)        Physical Exam   Constitutional: She is oriented to person, place, and time  She appears well-developed and well-nourished  HENT:   Head: Normocephalic and atraumatic  Neck: No thyromegaly present     Cardiovascular: Normal rate, regular rhythm and normal heart sounds  No lower extremity edema    Pulmonary/Chest: Effort normal and breath sounds normal    Lymphadenopathy:     She has no cervical adenopathy  Neurological: She is alert and oriented to person, place, and time  Psychiatric: She has a normal mood and affect  Thought content normal    Nursing note and vitals reviewed  BMI Counseling: Body mass index is 27 18 kg/m²  Discussed the patient's BMI with her  The BMI is above average  BMI counseling and education was provided to the patient  Nutrition recommendations include reducing intake of saturated fat and trans fat and reducing intake of cholesterol

## 2019-08-14 NOTE — ASSESSMENT & PLAN NOTE
Patient states her depression is in full remission   She is maintained with wellbutrin, abilify and lorazepam

## 2019-08-15 LAB
LABORATORY COMMENT REPORT: ABNORMAL
TRAMADOL UR QL SCN: POSITIVE NG/ML

## 2019-08-16 LAB — MISCELLANEOUS LAB TEST RESULT: NORMAL

## 2019-08-22 DIAGNOSIS — F41.9 ANXIETY: ICD-10-CM

## 2019-08-22 RX ORDER — LORAZEPAM 1 MG/1
1 TABLET ORAL 3 TIMES DAILY PRN
Qty: 90 TABLET | Refills: 0 | Status: CANCELLED | OUTPATIENT
Start: 2019-08-22

## 2019-08-22 NOTE — TELEPHONE ENCOUNTER
This patient has been followed by you  She got 270 lorazepam in May, but your note states that she is using it at bedtime only  Based on that, she should have a significant quantity left  I would like you to address this with her, or refill if appropriate based on her knowledge of the patient

## 2019-09-04 DIAGNOSIS — F41.9 ANXIETY: ICD-10-CM

## 2019-09-05 RX ORDER — LORAZEPAM 1 MG/1
1 TABLET ORAL 2 TIMES DAILY PRN
Qty: 180 TABLET | Refills: 0 | Status: SHIPPED | OUTPATIENT
Start: 2019-09-05 | End: 2019-11-20 | Stop reason: SDUPTHER

## 2019-09-06 NOTE — TELEPHONE ENCOUNTER
Given that at last OV patient not using lorazepam as frequently and did tell me she was mostly using at night; I think this would be a good and appropriate time to reduce the quantities she is getting  I have sent a new rx in with new instructions

## 2019-09-06 NOTE — TELEPHONE ENCOUNTER
Thank you  I reviewed my note and PDMP and she is in fact still filling rx from May so this next fill I have reduced the quantity of tablets

## 2019-11-20 DIAGNOSIS — F41.9 ANXIETY: ICD-10-CM

## 2019-11-20 RX ORDER — LORAZEPAM 1 MG/1
1 TABLET ORAL 2 TIMES DAILY PRN
Qty: 180 TABLET | Refills: 0 | Status: SHIPPED | OUTPATIENT
Start: 2019-11-20 | End: 2020-01-29 | Stop reason: ALTCHOICE

## 2020-01-21 DIAGNOSIS — F41.9 ANXIETY: ICD-10-CM

## 2020-01-21 RX ORDER — LORAZEPAM 1 MG/1
1 TABLET ORAL 2 TIMES DAILY PRN
Qty: 60 TABLET | Refills: 0 | OUTPATIENT
Start: 2020-01-21

## 2020-01-29 ENCOUNTER — APPOINTMENT (OUTPATIENT)
Dept: LAB | Facility: CLINIC | Age: 60
End: 2020-01-29
Payer: COMMERCIAL

## 2020-01-29 ENCOUNTER — OFFICE VISIT (OUTPATIENT)
Dept: FAMILY MEDICINE CLINIC | Facility: CLINIC | Age: 60
End: 2020-01-29
Payer: COMMERCIAL

## 2020-01-29 VITALS
HEART RATE: 84 BPM | DIASTOLIC BLOOD PRESSURE: 80 MMHG | SYSTOLIC BLOOD PRESSURE: 132 MMHG | BODY MASS INDEX: 27.79 KG/M2 | WEIGHT: 151 LBS | HEIGHT: 62 IN

## 2020-01-29 DIAGNOSIS — F41.9 ANXIETY: ICD-10-CM

## 2020-01-29 DIAGNOSIS — Z23 NEED FOR INFLUENZA VACCINATION: ICD-10-CM

## 2020-01-29 DIAGNOSIS — Z11.59 ENCOUNTER FOR HEPATITIS C SCREENING TEST FOR LOW RISK PATIENT: ICD-10-CM

## 2020-01-29 DIAGNOSIS — Z11.4 SCREENING FOR HIV (HUMAN IMMUNODEFICIENCY VIRUS): ICD-10-CM

## 2020-01-29 DIAGNOSIS — E78.2 MIXED HYPERLIPIDEMIA: ICD-10-CM

## 2020-01-29 DIAGNOSIS — I10 ESSENTIAL HYPERTENSION: Primary | ICD-10-CM

## 2020-01-29 DIAGNOSIS — G44.029 CHRONIC CLUSTER HEADACHE, NOT INTRACTABLE: ICD-10-CM

## 2020-01-29 DIAGNOSIS — F32.A DEPRESSIVE DISORDER: ICD-10-CM

## 2020-01-29 DIAGNOSIS — I10 ESSENTIAL HYPERTENSION: ICD-10-CM

## 2020-01-29 LAB
ALBUMIN SERPL BCP-MCNC: 3.7 G/DL (ref 3.5–5)
ALP SERPL-CCNC: 98 U/L (ref 46–116)
ALT SERPL W P-5'-P-CCNC: 32 U/L (ref 12–78)
ANION GAP SERPL CALCULATED.3IONS-SCNC: 5 MMOL/L (ref 4–13)
AST SERPL W P-5'-P-CCNC: 18 U/L (ref 5–45)
BILIRUB DIRECT SERPL-MCNC: 0.11 MG/DL (ref 0–0.2)
BILIRUB SERPL-MCNC: 0.39 MG/DL (ref 0.2–1)
BUN SERPL-MCNC: 24 MG/DL (ref 5–25)
CALCIUM SERPL-MCNC: 9 MG/DL (ref 8.3–10.1)
CHLORIDE SERPL-SCNC: 111 MMOL/L (ref 100–108)
CHOLEST SERPL-MCNC: 183 MG/DL (ref 50–200)
CO2 SERPL-SCNC: 32 MMOL/L (ref 21–32)
CREAT SERPL-MCNC: 0.86 MG/DL (ref 0.6–1.3)
GFR SERPL CREATININE-BSD FRML MDRD: 75 ML/MIN/1.73SQ M
GLUCOSE P FAST SERPL-MCNC: 110 MG/DL (ref 65–99)
HCV AB SER QL: NORMAL
HDLC SERPL-MCNC: 84 MG/DL
LDLC SERPL CALC-MCNC: 81 MG/DL (ref 0–100)
NONHDLC SERPL-MCNC: 99 MG/DL
POTASSIUM SERPL-SCNC: 4.1 MMOL/L (ref 3.5–5.3)
PROT SERPL-MCNC: 7.5 G/DL (ref 6.4–8.2)
SODIUM SERPL-SCNC: 148 MMOL/L (ref 136–145)
TRIGL SERPL-MCNC: 90 MG/DL
TSH SERPL DL<=0.05 MIU/L-ACNC: 1.17 UIU/ML (ref 0.36–3.74)

## 2020-01-29 PROCEDURE — 87389 HIV-1 AG W/HIV-1&-2 AB AG IA: CPT

## 2020-01-29 PROCEDURE — 80053 COMPREHEN METABOLIC PANEL: CPT | Performed by: NURSE PRACTITIONER

## 2020-01-29 PROCEDURE — 3079F DIAST BP 80-89 MM HG: CPT | Performed by: NURSE PRACTITIONER

## 2020-01-29 PROCEDURE — 36415 COLL VENOUS BLD VENIPUNCTURE: CPT | Performed by: NURSE PRACTITIONER

## 2020-01-29 PROCEDURE — 1036F TOBACCO NON-USER: CPT | Performed by: NURSE PRACTITIONER

## 2020-01-29 PROCEDURE — 82248 BILIRUBIN DIRECT: CPT | Performed by: NURSE PRACTITIONER

## 2020-01-29 PROCEDURE — 3008F BODY MASS INDEX DOCD: CPT | Performed by: NURSE PRACTITIONER

## 2020-01-29 PROCEDURE — 84443 ASSAY THYROID STIM HORMONE: CPT | Performed by: NURSE PRACTITIONER

## 2020-01-29 PROCEDURE — 99214 OFFICE O/P EST MOD 30 MIN: CPT | Performed by: NURSE PRACTITIONER

## 2020-01-29 PROCEDURE — 90682 RIV4 VACC RECOMBINANT DNA IM: CPT

## 2020-01-29 PROCEDURE — 86803 HEPATITIS C AB TEST: CPT | Performed by: NURSE PRACTITIONER

## 2020-01-29 PROCEDURE — 3075F SYST BP GE 130 - 139MM HG: CPT | Performed by: NURSE PRACTITIONER

## 2020-01-29 PROCEDURE — 80061 LIPID PANEL: CPT | Performed by: NURSE PRACTITIONER

## 2020-01-29 PROCEDURE — 90471 IMMUNIZATION ADMIN: CPT

## 2020-01-29 RX ORDER — TOPIRAMATE 25 MG/1
TABLET ORAL
Qty: 127 TABLET | Refills: 0 | Status: SHIPPED | OUTPATIENT
Start: 2020-01-29 | End: 2020-09-01 | Stop reason: ALTCHOICE

## 2020-01-29 RX ORDER — LORAZEPAM 1 MG/1
1 TABLET ORAL 2 TIMES DAILY PRN
Qty: 180 TABLET | Refills: 0 | Status: CANCELLED | OUTPATIENT
Start: 2020-01-29

## 2020-01-29 RX ORDER — ROSUVASTATIN CALCIUM 10 MG/1
10 TABLET, COATED ORAL DAILY
Qty: 90 TABLET | Refills: 1 | Status: SHIPPED | OUTPATIENT
Start: 2020-01-29 | End: 2020-09-01 | Stop reason: SDUPTHER

## 2020-01-29 NOTE — ASSESSMENT & PLAN NOTE
We will start patient on trintellix 10 mg  She wants to come off all her other medications  We discussed side effects  Patient ok with this plan

## 2020-01-29 NOTE — ASSESSMENT & PLAN NOTE
We discussed several prevention medications  patient would like to try Topamax  E will start titration schedule  Return in 2 months  She is also currently taking magnesium 500 mg BID, I have advised her to also start vitamin B2 200 mg BID

## 2020-01-29 NOTE — PROGRESS NOTES
Assessment and Plan:    Problem List Items Addressed This Visit        Cardiovascular and Mediastinum    Essential hypertension - Primary     Patient blood pressure is doing well  She is on metoprolol  Relevant Medications    metoprolol tartrate (LOPRESSOR) 25 mg tablet    Other Relevant Orders    TSH, 3rd generation with Free T4 reflex    Comprehensive metabolic panel       Nervous and Auditory    Headaches, cluster     We discussed several prevention medications  patient would like to try Topamax  E will start titration schedule  Return in 2 months  She is also currently taking magnesium 500 mg BID, I have advised her to also start vitamin B2 200 mg BID  Relevant Medications    metoprolol tartrate (LOPRESSOR) 25 mg tablet    vortioxetine (TRINTELLIX) 10 MG tablet    topiramate (TOPAMAX) 25 mg tablet    Riboflavin 100 MG TABS       Other    Anxiety     We will start patient on trintellix 10 mg  She wants to come off all her other medications  We discussed side effects  Patient ok with this plan  Relevant Medications    vortioxetine (TRINTELLIX) 10 MG tablet    Other Relevant Orders    TSH, 3rd generation with Free T4 reflex    Depressive disorder     Patient wants to try trintellix  We can discontinue wellbutrin, abilify and celexa  Relevant Medications    vortioxetine (TRINTELLIX) 10 MG tablet    Mixed hyperlipidemia     Patient is on rosuvastatin 10 mg  Relevant Medications    rosuvastatin (CRESTOR) 10 MG tablet    Other Relevant Orders    Lipid panel      Other Visit Diagnoses     Encounter for hepatitis C screening test for low risk patient        Relevant Orders    Hepatitis C antibody    Screening for HIV (human immunodeficiency virus)        Relevant Orders    [facility] HIV 1/2 AG-AB combo                 Diagnoses and all orders for this visit:    Essential hypertension  -     metoprolol tartrate (LOPRESSOR) 25 mg tablet;  Take 1 tablet (25 mg total) by mouth daily  -     TSH, 3rd generation with Free T4 reflex; Future  -     Comprehensive metabolic panel    Mixed hyperlipidemia  -     rosuvastatin (CRESTOR) 10 MG tablet; Take 1 tablet (10 mg total) by mouth daily  -     Lipid panel    Anxiety  -     vortioxetine (TRINTELLIX) 10 MG tablet; Take 1 tablet (10 mg total) by mouth daily  -     TSH, 3rd generation with Free T4 reflex; Future    Depressive disorder  -     vortioxetine (TRINTELLIX) 10 MG tablet; Take 1 tablet (10 mg total) by mouth daily    Chronic cluster headache, not intractable  -     topiramate (TOPAMAX) 25 mg tablet; Take 1 tablet (25 mg total) by mouth daily at bedtime for 7 days, THEN 2 tablets (50 mg total) daily at bedtime   -     Riboflavin 100 MG TABS; Take 2 tablets (200 mg total) by mouth 2 (two) times a day    Encounter for hepatitis C screening test for low risk patient  -     Hepatitis C antibody; Future    Screening for HIV (human immunodeficiency virus)  -     [facility] HIV 1/2 AG-AB combo; Future    Other orders  -     Cancel: LORazepam (ATIVAN) 1 mg tablet; Take 1 tablet (1 mg total) by mouth 2 (two) times a day as needed for anxiety              Subjective:      Patient ID: Araseli Acuña is a 64 y o  female  CC:    Chief Complaint   Patient presents with    Follow-up     patient is here for a med check  Patient wants to discuss alternatives to tramadol  Her insurance wont pay for it  Patient also has questions re: Trintellix  ak       HPI:    Patient here to discuss alternative medications  She states she does not want to be on so many medications  She is asking if trintellix is appropriate  Hypertension   This is a chronic problem  The current episode started today  The problem is unchanged  The problem is controlled  Pertinent negatives include no anxiety, chest pain, malaise/fatigue, palpitations, peripheral edema or shortness of breath  There are no associated agents to hypertension   Risk factors for coronary artery disease include post-menopausal state  Past treatments include beta blockers  The current treatment provides significant improvement  Compliance problems include exercise  Hyperlipidemia   This is a chronic problem  The current episode started more than 1 year ago  The problem is uncontrolled  Recent lipid tests were reviewed and are variable  Pertinent negatives include no chest pain, myalgias or shortness of breath  Current antihyperlipidemic treatment includes statins  The current treatment provides significant improvement of lipids  Risk factors for coronary artery disease include stress  The following portions of the patient's history were reviewed and updated as appropriate: allergies, current medications, past family history, past medical history, past social history, past surgical history and problem list       Review of Systems   Constitutional: Negative for chills, diaphoresis, fatigue, fever, malaise/fatigue and unexpected weight change  HENT: Negative for trouble swallowing  Eyes: Negative for visual disturbance  Respiratory: Negative for cough, chest tightness and shortness of breath  Cardiovascular: Negative for chest pain, palpitations and leg swelling  Gastrointestinal: Negative for diarrhea  Endocrine: Negative for polydipsia, polyphagia and polyuria  Genitourinary: Negative for decreased urine volume  Musculoskeletal: Negative for myalgias  Skin: Negative for wound  Neurological: Negative for dizziness, weakness, light-headedness and numbness  Psychiatric/Behavioral: Negative for dysphoric mood and sleep disturbance  The patient is not nervous/anxious  Data to review:       Objective:    Vitals:    01/29/20 1045   BP: 132/80   Pulse: 84   Weight: 68 5 kg (151 lb)   Height: 5' 2" (1 575 m)        Physical Exam   Constitutional: She is oriented to person, place, and time  She appears well-developed and well-nourished  HENT:   Head: Normocephalic and atraumatic  Neck: No JVD present  No thyromegaly present  Cardiovascular: Normal rate, regular rhythm and normal heart sounds  No lower extremity edema    Pulmonary/Chest: Effort normal and breath sounds normal    Musculoskeletal: Normal range of motion  Neurological: She is alert and oriented to person, place, and time  Psychiatric: She has a normal mood and affect  Thought content normal    Nursing note and vitals reviewed

## 2020-01-30 LAB — HIV 1+2 AB+HIV1 P24 AG SERPL QL IA: NORMAL

## 2020-02-18 DIAGNOSIS — I10 ESSENTIAL HYPERTENSION: ICD-10-CM

## 2020-02-18 NOTE — TELEPHONE ENCOUNTER
Pharmacy is also requesting refills on     Aripiprazole 5 mg tablets  Bupropion HCL  mg tablets  Citalopram Hydrobromide 20 mg tablets  Lorazepam 1 mg tablet

## 2020-03-12 ENCOUNTER — TELEPHONE (OUTPATIENT)
Dept: FAMILY MEDICINE CLINIC | Facility: CLINIC | Age: 60
End: 2020-03-12

## 2020-03-12 DIAGNOSIS — F32.A DEPRESSIVE DISORDER: ICD-10-CM

## 2020-03-12 DIAGNOSIS — F41.9 ANXIETY: ICD-10-CM

## 2020-03-12 NOTE — TELEPHONE ENCOUNTER
PATIENT STATES SHE HAS NOT BEEN HAVING A GOOD COUPLE OF DAYS AND WOULD LIKE TO HAVE SOMETHING FOR ANXIETY  SHE WAS PREVIOUSLY ON MEDICATION FOR THIS AND THEY SWITCHED YOUR MEDICATION  SHE FEELS SHE IS NOT REALLY DOING WELL WITH THE ANXIETY  PLEASE   PLEASE CALL PATIENT [phone]

## 2020-03-16 RX ORDER — CITALOPRAM 20 MG/1
20 TABLET ORAL DAILY
Qty: 90 TABLET | Refills: 1 | Status: SHIPPED | OUTPATIENT
Start: 2020-03-16 | End: 2020-09-01 | Stop reason: SDUPTHER

## 2020-03-16 RX ORDER — ARIPIPRAZOLE 5 MG/1
5 TABLET ORAL DAILY
Qty: 90 TABLET | Refills: 1 | Status: SHIPPED | OUTPATIENT
Start: 2020-03-16 | End: 2020-09-01 | Stop reason: SDUPTHER

## 2020-03-16 RX ORDER — BUPROPION HYDROCHLORIDE 150 MG/1
150 TABLET ORAL DAILY
Qty: 90 TABLET | Refills: 1 | Status: SHIPPED | OUTPATIENT
Start: 2020-03-16 | End: 2020-09-01 | Stop reason: SDUPTHER

## 2020-03-16 NOTE — TELEPHONE ENCOUNTER
Pt returned call: She would like to stop trintellix and go back on wellbutrin, abilify, citalopram and lorazepam   If this is appropriate, send rx's to [facility] [place]

## 2020-03-18 NOTE — TELEPHONE ENCOUNTER
Patient called back  She is very upset that you won't prescribe the Lorazepam  I Tried to explain the meds you did prescribe are for anxiety but she wants Lorazepam too  I advised to make an appt to discuss it with you in person

## 2020-03-19 RX ORDER — LORAZEPAM 1 MG/1
1 TABLET ORAL 2 TIMES DAILY PRN
Qty: 60 TABLET | Refills: 0 | Status: CANCELLED | OUTPATIENT
Start: 2020-03-19

## 2020-03-19 NOTE — TELEPHONE ENCOUNTER
PMED not reviewed; no access      Last refill 11/20/2019 according to [facility]  Last visit: 1/29/2020  Next visit: 3/30/2020

## 2020-03-25 NOTE — TELEPHONE ENCOUNTER
I do not prefer benzos for the treatment of anxiety and do not restart medications once patient are weaned off

## 2020-05-17 DIAGNOSIS — F41.9 ANXIETY: ICD-10-CM

## 2020-05-20 RX ORDER — LORAZEPAM 1 MG/1
TABLET ORAL
Qty: 180 TABLET | Refills: 0 | OUTPATIENT
Start: 2020-05-20

## 2020-06-16 DIAGNOSIS — F41.9 ANXIETY: ICD-10-CM

## 2020-06-16 RX ORDER — LORAZEPAM 1 MG/1
TABLET ORAL
Qty: 180 TABLET | Refills: 0 | OUTPATIENT
Start: 2020-06-16

## 2020-07-16 DIAGNOSIS — F41.9 ANXIETY: ICD-10-CM

## 2020-07-17 RX ORDER — LORAZEPAM 1 MG/1
TABLET ORAL
Qty: 180 TABLET | Refills: 0 | OUTPATIENT
Start: 2020-07-17

## 2020-08-12 DIAGNOSIS — I10 ESSENTIAL HYPERTENSION: ICD-10-CM

## 2020-09-01 ENCOUNTER — OFFICE VISIT (OUTPATIENT)
Dept: FAMILY MEDICINE CLINIC | Facility: CLINIC | Age: 60
End: 2020-09-01
Payer: COMMERCIAL

## 2020-09-01 VITALS
HEART RATE: 60 BPM | HEIGHT: 62 IN | BODY MASS INDEX: 24.55 KG/M2 | WEIGHT: 133.4 LBS | TEMPERATURE: 98 F | SYSTOLIC BLOOD PRESSURE: 120 MMHG | RESPIRATION RATE: 18 BRPM | DIASTOLIC BLOOD PRESSURE: 72 MMHG

## 2020-09-01 DIAGNOSIS — F32.A DEPRESSIVE DISORDER: ICD-10-CM

## 2020-09-01 DIAGNOSIS — F41.9 ANXIETY: ICD-10-CM

## 2020-09-01 DIAGNOSIS — E78.2 MIXED HYPERLIPIDEMIA: ICD-10-CM

## 2020-09-01 DIAGNOSIS — I10 ESSENTIAL HYPERTENSION: ICD-10-CM

## 2020-09-01 PROCEDURE — 99214 OFFICE O/P EST MOD 30 MIN: CPT | Performed by: NURSE PRACTITIONER

## 2020-09-01 RX ORDER — LORAZEPAM 0.5 MG/1
0.5 TABLET ORAL 2 TIMES DAILY PRN
Qty: 30 TABLET | Refills: 0 | Status: SHIPPED | OUTPATIENT
Start: 2020-09-01

## 2020-09-01 RX ORDER — ARIPIPRAZOLE 5 MG/1
5 TABLET ORAL DAILY
Qty: 90 TABLET | Refills: 0 | Status: SHIPPED | OUTPATIENT
Start: 2020-09-01 | End: 2020-09-15

## 2020-09-01 RX ORDER — BUPROPION HYDROCHLORIDE 150 MG/1
150 TABLET ORAL DAILY
Qty: 90 TABLET | Refills: 0 | Status: SHIPPED | OUTPATIENT
Start: 2020-09-01 | End: 2020-09-15

## 2020-09-01 RX ORDER — CITALOPRAM 40 MG/1
40 TABLET ORAL DAILY
Qty: 90 TABLET | Refills: 0 | Status: SHIPPED | OUTPATIENT
Start: 2020-09-01

## 2020-09-01 RX ORDER — CITALOPRAM 20 MG/1
20 TABLET ORAL DAILY
Qty: 90 TABLET | Refills: 0 | Status: SHIPPED | OUTPATIENT
Start: 2020-09-01 | End: 2020-09-01 | Stop reason: SDUPTHER

## 2020-09-01 RX ORDER — ROSUVASTATIN CALCIUM 10 MG/1
10 TABLET, COATED ORAL DAILY
Qty: 90 TABLET | Refills: 0 | Status: SHIPPED | OUTPATIENT
Start: 2020-09-01 | End: 2020-09-15

## 2020-09-01 NOTE — PROGRESS NOTES
Assessment and Plan:    Problem List Items Addressed This Visit        Cardiovascular and Mediastinum    Essential hypertension     Patient's blood pressure is doing very well she is currently on metoprolol 25 mg daily  No changes made to medication  Ninety day a written prescription given to patient as she is relocating         Relevant Medications    metoprolol tartrate (LOPRESSOR) 25 mg tablet       Other    Anxiety     In between offices patient was intolerable to Trintellix  Patient was subsequently restarted on her previous medication including Celexa Wellbutrin Abilify  Patient will receive 90 day prescriptions for these medications I will increase her Celexa to 40 as patient is attributing increased anxiety related to pandemic and being out of work  Will send electronic prescription for 30 tablets of lorazepam only  Relevant Medications    LORazepam (ATIVAN) 0 5 mg tablet    citalopram (CeleXA) 40 mg tablet    Depressive disorder     Patient is currently on Celexa Wellbutrin and Abilify  Reporting not doing well with anxiety depression symptoms are stable no changes made to medication         Relevant Medications    buPROPion (WELLBUTRIN XL) 150 mg 24 hr tablet    ARIPiprazole (ABILIFY) 5 mg tablet    LORazepam (ATIVAN) 0 5 mg tablet    citalopram (CeleXA) 40 mg tablet    Mixed hyperlipidemia     Patient's cholesterol remains stable she is on rosuvastatin 10 mg no changes made to medications again patient was provided a printed 90 day prescription         Relevant Medications    rosuvastatin (CRESTOR) 10 MG tablet                 Diagnoses and all orders for this visit:    Depressive disorder  -     buPROPion (WELLBUTRIN XL) 150 mg 24 hr tablet; Take 1 tablet (150 mg total) by mouth daily  -     Discontinue: citalopram (CeleXA) 20 mg tablet; Take 1 tablet (20 mg total) by mouth daily  -     ARIPiprazole (ABILIFY) 5 mg tablet;  Take 1 tablet (5 mg total) by mouth daily  -     citalopram (CeleXA) 40 mg tablet; Take 1 tablet (40 mg total) by mouth daily    Anxiety  -     Discontinue: citalopram (CeleXA) 20 mg tablet; Take 1 tablet (20 mg total) by mouth daily  -     LORazepam (ATIVAN) 0 5 mg tablet; Take 1 tablet (0 5 mg total) by mouth 2 (two) times a day as needed for anxiety  -     citalopram (CeleXA) 40 mg tablet; Take 1 tablet (40 mg total) by mouth daily    Essential hypertension  -     metoprolol tartrate (LOPRESSOR) 25 mg tablet; Take 1 tablet (25 mg total) by mouth daily    Mixed hyperlipidemia  -     rosuvastatin (CRESTOR) 10 MG tablet; Take 1 tablet (10 mg total) by mouth daily              Subjective:      Patient ID: Tom Barba is a 64 y o  female  CC:    Chief Complaint   Patient presents with    Follow-up    Medication Refill       HPI:    Patient reports increased anxiety with the pandemic  Patient is not working, she is under some stress  She was previously using medical marijuana and doing well with the management of her anxiety but in order to get a job she has to be free from substance  She has been using benadryl at night to help with sleeping  Patient reports she is relocating to [place] on 9/28/20 and is requesting 90 day rx on her medications  Patient will be staying with her sister for period of 10 days  Patient states that she sold her house and is currently using her savings to make means end  Patient reports being out of work for some time she has been unable to receive unemployment compensation benefits  Patient does not have concerns about her other chronic medical conditions  The following portions of the patient's history were reviewed and updated as appropriate: allergies, current medications, past family history, past medical history, past social history, past surgical history and problem list       Review of Systems   Constitutional: Negative for fatigue  HENT: Negative for trouble swallowing  Eyes: Negative for visual disturbance     Respiratory: Negative for chest tightness and shortness of breath  Cardiovascular: Negative for chest pain, palpitations and leg swelling  Gastrointestinal: Negative  Endocrine: Negative for polydipsia, polyphagia and polyuria  Skin: Negative  Neurological: Negative for dizziness, light-headedness and headaches  Psychiatric/Behavioral: Positive for sleep disturbance  Negative for dysphoric mood  The patient is nervous/anxious  Data to review:       Objective:    Vitals:    09/01/20 1113   BP: 120/72   BP Location: Left arm   Patient Position: Sitting   Cuff Size: Adult   Pulse: 60   Resp: 18   Temp: 98 °F (36 7 °C)   TempSrc: Tympanic   Weight: 60 5 kg (133 lb 6 4 oz)   Height: 5' 2" (1 575 m)        Physical Exam  Vitals signs and nursing note reviewed  Constitutional:       General: She is not in acute distress  Appearance: Normal appearance  She is not ill-appearing or diaphoretic  HENT:      Head: Normocephalic and atraumatic  Right Ear: External ear normal       Left Ear: External ear normal    Eyes:      Extraocular Movements: Extraocular movements intact  Conjunctiva/sclera: Conjunctivae normal    Neck:      Thyroid: No thyromegaly  Vascular: No carotid bruit or JVD  Cardiovascular:      Rate and Rhythm: Normal rate and regular rhythm  Pulses:           Carotid pulses are 2+ on the right side and 2+ on the left side  Heart sounds: Normal heart sounds, S1 normal and S2 normal  No murmur  Pulmonary:      Effort: Pulmonary effort is normal       Breath sounds: Normal breath sounds  Musculoskeletal:      Right lower leg: No edema  Left lower leg: No edema  Skin:     Coloration: Skin is not pale  Neurological:      Mental Status: She is alert and oriented to person, place, and time  Psychiatric:         Mood and Affect: Mood is anxious  Speech: Speech normal          Behavior: Behavior normal          Thought Content:  Thought content normal  Thought content does not include suicidal ideation  Thought content does not include suicidal plan           Judgment: Judgment normal

## 2020-09-02 NOTE — ASSESSMENT & PLAN NOTE
Patient is currently on Celexa Wellbutrin and Abilify    Reporting not doing well with anxiety depression symptoms are stable no changes made to medication

## 2020-09-02 NOTE — ASSESSMENT & PLAN NOTE
Patient's blood pressure is doing very well she is currently on metoprolol 25 mg daily  No changes made to medication    Ninety day a written prescription given to patient as she is relocating

## 2020-09-02 NOTE — ASSESSMENT & PLAN NOTE
In between offices patient was intolerable to Trintellix  Patient was subsequently restarted on her previous medication including Celexa Wellbutrin Abilify  Patient will receive 90 day prescriptions for these medications I will increase her Celexa to 40 as patient is attributing increased anxiety related to pandemic and being out of work  Will send electronic prescription for 30 tablets of lorazepam only

## 2020-09-02 NOTE — ASSESSMENT & PLAN NOTE
Patient's cholesterol remains stable she is on rosuvastatin 10 mg no changes made to medications again patient was provided a printed 90 day prescription

## 2020-09-14 DIAGNOSIS — F32.A DEPRESSIVE DISORDER: ICD-10-CM

## 2020-09-14 DIAGNOSIS — F41.9 ANXIETY: ICD-10-CM

## 2020-09-14 DIAGNOSIS — E78.2 MIXED HYPERLIPIDEMIA: ICD-10-CM

## 2020-09-14 DIAGNOSIS — I10 ESSENTIAL HYPERTENSION: ICD-10-CM

## 2020-09-15 RX ORDER — BUPROPION HYDROCHLORIDE 150 MG/1
TABLET ORAL
Qty: 90 TABLET | Refills: 1 | Status: SHIPPED | OUTPATIENT
Start: 2020-09-15

## 2020-09-15 RX ORDER — ROSUVASTATIN CALCIUM 10 MG/1
TABLET, COATED ORAL
Qty: 90 TABLET | Refills: 1 | Status: SHIPPED | OUTPATIENT
Start: 2020-09-15

## 2020-09-15 RX ORDER — ARIPIPRAZOLE 5 MG/1
TABLET ORAL
Qty: 90 TABLET | Refills: 1 | Status: SHIPPED | OUTPATIENT
Start: 2020-09-15

## 2020-09-15 RX ORDER — CITALOPRAM 20 MG/1
TABLET ORAL
Qty: 90 TABLET | Refills: 1 | OUTPATIENT
Start: 2020-09-15

## 2020-10-14 DIAGNOSIS — F41.9 ANXIETY: ICD-10-CM

## 2020-10-14 DIAGNOSIS — F32.A DEPRESSIVE DISORDER: ICD-10-CM

## 2020-10-15 ENCOUNTER — TELEPHONE (OUTPATIENT)
Dept: FAMILY MEDICINE CLINIC | Facility: CLINIC | Age: 60
End: 2020-10-15

## 2020-10-15 RX ORDER — CITALOPRAM 20 MG/1
TABLET ORAL
Qty: 90 TABLET | Refills: 1 | OUTPATIENT
Start: 2020-10-15

## 2020-11-13 DIAGNOSIS — F32.A DEPRESSIVE DISORDER: ICD-10-CM

## 2020-11-13 DIAGNOSIS — F41.9 ANXIETY: ICD-10-CM

## 2020-11-13 RX ORDER — CITALOPRAM 20 MG/1
TABLET ORAL
Qty: 90 TABLET | Refills: 1 | OUTPATIENT
Start: 2020-11-13

## 2020-12-20 DIAGNOSIS — F41.9 ANXIETY: ICD-10-CM

## 2020-12-20 DIAGNOSIS — F32.A DEPRESSIVE DISORDER: ICD-10-CM

## 2020-12-20 RX ORDER — CITALOPRAM 20 MG/1
TABLET ORAL
Qty: 90 TABLET | Refills: 1 | OUTPATIENT
Start: 2020-12-20

## 2021-01-19 DIAGNOSIS — F41.9 ANXIETY: ICD-10-CM

## 2021-01-19 DIAGNOSIS — F32.A DEPRESSIVE DISORDER: ICD-10-CM

## 2021-01-19 RX ORDER — CITALOPRAM 20 MG/1
TABLET ORAL
Qty: 90 TABLET | Refills: 1 | OUTPATIENT
Start: 2021-01-19

## 2021-02-18 DIAGNOSIS — F41.9 ANXIETY: ICD-10-CM

## 2021-02-18 DIAGNOSIS — F32.A DEPRESSIVE DISORDER: ICD-10-CM

## 2021-02-18 RX ORDER — CITALOPRAM 20 MG/1
TABLET ORAL
Qty: 90 TABLET | Refills: 1 | OUTPATIENT
Start: 2021-02-18